# Patient Record
Sex: FEMALE | Race: WHITE
[De-identification: names, ages, dates, MRNs, and addresses within clinical notes are randomized per-mention and may not be internally consistent; named-entity substitution may affect disease eponyms.]

---

## 2017-04-30 ENCOUNTER — HOSPITAL ENCOUNTER (OUTPATIENT)
Dept: HOSPITAL 17 - NEDDLT | Age: 62
Setting detail: OBSERVATION
LOS: 2 days | Discharge: HOME | End: 2017-05-02
Attending: HOSPITALIST | Admitting: HOSPITALIST
Payer: OTHER GOVERNMENT

## 2017-04-30 VITALS
OXYGEN SATURATION: 96 % | TEMPERATURE: 97.9 F | SYSTOLIC BLOOD PRESSURE: 150 MMHG | RESPIRATION RATE: 20 BRPM | DIASTOLIC BLOOD PRESSURE: 76 MMHG | HEART RATE: 55 BPM

## 2017-04-30 VITALS — HEART RATE: 60 BPM

## 2017-04-30 VITALS
SYSTOLIC BLOOD PRESSURE: 115 MMHG | RESPIRATION RATE: 20 BRPM | TEMPERATURE: 97.1 F | DIASTOLIC BLOOD PRESSURE: 58 MMHG | OXYGEN SATURATION: 96 % | HEART RATE: 64 BPM

## 2017-04-30 VITALS — BODY MASS INDEX: 46.31 KG/M2 | HEIGHT: 60 IN | WEIGHT: 235.89 LBS

## 2017-04-30 VITALS
OXYGEN SATURATION: 96 % | TEMPERATURE: 97.6 F | HEART RATE: 56 BPM | DIASTOLIC BLOOD PRESSURE: 65 MMHG | RESPIRATION RATE: 18 BRPM | SYSTOLIC BLOOD PRESSURE: 146 MMHG

## 2017-04-30 VITALS
DIASTOLIC BLOOD PRESSURE: 54 MMHG | OXYGEN SATURATION: 99 % | HEART RATE: 55 BPM | TEMPERATURE: 97.8 F | SYSTOLIC BLOOD PRESSURE: 115 MMHG | RESPIRATION RATE: 18 BRPM

## 2017-04-30 VITALS — OXYGEN SATURATION: 93 %

## 2017-04-30 VITALS
OXYGEN SATURATION: 99 % | HEART RATE: 70 BPM | SYSTOLIC BLOOD PRESSURE: 125 MMHG | DIASTOLIC BLOOD PRESSURE: 84 MMHG | TEMPERATURE: 97.7 F | RESPIRATION RATE: 20 BRPM

## 2017-04-30 VITALS — HEART RATE: 68 BPM

## 2017-04-30 VITALS — OXYGEN SATURATION: 96 %

## 2017-04-30 DIAGNOSIS — I10: ICD-10-CM

## 2017-04-30 DIAGNOSIS — G89.29: ICD-10-CM

## 2017-04-30 DIAGNOSIS — K29.70: Primary | ICD-10-CM

## 2017-04-30 DIAGNOSIS — F17.210: ICD-10-CM

## 2017-04-30 DIAGNOSIS — E03.9: ICD-10-CM

## 2017-04-30 DIAGNOSIS — K20.9: ICD-10-CM

## 2017-04-30 DIAGNOSIS — Z90.49: ICD-10-CM

## 2017-04-30 DIAGNOSIS — M54.9: ICD-10-CM

## 2017-04-30 DIAGNOSIS — Z87.11: ICD-10-CM

## 2017-04-30 LAB
ALP SERPL-CCNC: 58 U/L (ref 45–117)
ALT SERPL-CCNC: 26 U/L (ref 10–53)
ANION GAP SERPL CALC-SCNC: 7 MEQ/L (ref 5–15)
AST SERPL-CCNC: 16 U/L (ref 15–37)
BASOPHILS # BLD AUTO: 0 TH/MM3 (ref 0–0.2)
BASOPHILS NFR BLD: 0.9 % (ref 0–2)
BILIRUB SERPL-MCNC: 0.3 MG/DL (ref 0.2–1)
BUN SERPL-MCNC: 12 MG/DL (ref 7–18)
CHLORIDE SERPL-SCNC: 109 MEQ/L (ref 98–107)
EOSINOPHIL # BLD: 0.3 TH/MM3 (ref 0–0.4)
EOSINOPHIL NFR BLD: 6.2 % (ref 0–4)
ERYTHROCYTE [DISTWIDTH] IN BLOOD BY AUTOMATED COUNT: 14.1 % (ref 11.6–17.2)
GFR SERPLBLD BASED ON 1.73 SQ M-ARVRAT: 70 ML/MIN (ref 89–?)
HCO3 BLD-SCNC: 24.5 MEQ/L (ref 21–32)
HCT VFR BLD CALC: 41.1 % (ref 35–46)
HDLC SERPL-MCNC: 39 MG/DL (ref 40–60)
HEMO FLAGS: (no result)
LDLC SERPL-MCNC: 99 MG/DL (ref 0–99)
LYMPHOCYTES # BLD AUTO: 2.2 TH/MM3 (ref 1–4.8)
LYMPHOCYTES NFR BLD AUTO: 48 % (ref 9–44)
MCH RBC QN AUTO: 29.1 PG (ref 27–34)
MCHC RBC AUTO-ENTMCNC: 32.5 % (ref 32–36)
MCV RBC AUTO: 89.6 FL (ref 80–100)
MONOCYTES NFR BLD: 8.7 % (ref 0–8)
NEUTROPHILS # BLD AUTO: 1.7 TH/MM3 (ref 1.8–7.7)
NEUTROPHILS NFR BLD AUTO: 36.2 % (ref 16–70)
PLATELET # BLD: 215 TH/MM3 (ref 150–450)
POTASSIUM SERPL-SCNC: 4.1 MEQ/L (ref 3.5–5.1)
RBC # BLD AUTO: 4.59 MIL/MM3 (ref 4–5.3)
SODIUM SERPL-SCNC: 140 MEQ/L (ref 136–145)
WBC # BLD AUTO: 4.6 TH/MM3 (ref 4–11)

## 2017-04-30 PROCEDURE — 86038 ANTINUCLEAR ANTIBODIES: CPT

## 2017-04-30 PROCEDURE — 96375 TX/PRO/DX INJ NEW DRUG ADDON: CPT

## 2017-04-30 PROCEDURE — G0378 HOSPITAL OBSERVATION PER HR: HCPCS

## 2017-04-30 PROCEDURE — 74177 CT ABD & PELVIS W/CONTRAST: CPT

## 2017-04-30 PROCEDURE — 82550 ASSAY OF CK (CPK): CPT

## 2017-04-30 PROCEDURE — 96374 THER/PROPH/DIAG INJ IV PUSH: CPT

## 2017-04-30 PROCEDURE — 85025 COMPLETE CBC W/AUTO DIFF WBC: CPT

## 2017-04-30 PROCEDURE — 85610 PROTHROMBIN TIME: CPT

## 2017-04-30 PROCEDURE — 43239 EGD BIOPSY SINGLE/MULTIPLE: CPT

## 2017-04-30 PROCEDURE — 80048 BASIC METABOLIC PNL TOTAL CA: CPT

## 2017-04-30 PROCEDURE — 83690 ASSAY OF LIPASE: CPT

## 2017-04-30 PROCEDURE — 99281 EMR DPT VST MAYX REQ PHY/QHP: CPT

## 2017-04-30 PROCEDURE — 88305 TISSUE EXAM BY PATHOLOGIST: CPT

## 2017-04-30 PROCEDURE — 87086 URINE CULTURE/COLONY COUNT: CPT

## 2017-04-30 PROCEDURE — 85652 RBC SED RATE AUTOMATED: CPT

## 2017-04-30 PROCEDURE — 99285 EMERGENCY DEPT VISIT HI MDM: CPT

## 2017-04-30 PROCEDURE — 80061 LIPID PANEL: CPT

## 2017-04-30 PROCEDURE — 80053 COMPREHEN METABOLIC PANEL: CPT

## 2017-04-30 PROCEDURE — 93005 ELECTROCARDIOGRAM TRACING: CPT

## 2017-04-30 PROCEDURE — 96361 HYDRATE IV INFUSION ADD-ON: CPT

## 2017-04-30 PROCEDURE — 00740: CPT

## 2017-04-30 PROCEDURE — 84484 ASSAY OF TROPONIN QUANT: CPT

## 2017-04-30 PROCEDURE — 85730 THROMBOPLASTIN TIME PARTIAL: CPT

## 2017-04-30 PROCEDURE — 71010: CPT

## 2017-04-30 PROCEDURE — 81001 URINALYSIS AUTO W/SCOPE: CPT

## 2017-04-30 RX ADMIN — Medication SCH ML: at 20:17

## 2017-04-30 RX ADMIN — ONDANSETRON PRN MG: 2 INJECTION, SOLUTION INTRAMUSCULAR; INTRAVENOUS at 06:18

## 2017-04-30 RX ADMIN — DICYCLOMINE HYDROCHLORIDE SCH MG: 20 TABLET ORAL at 12:40

## 2017-04-30 RX ADMIN — OXYCODONE HYDROCHLORIDE AND ACETAMINOPHEN PRN TAB: 10; 325 TABLET ORAL at 12:41

## 2017-04-30 RX ADMIN — Medication SCH ML: at 08:46

## 2017-04-30 RX ADMIN — NEBIVOLOL HYDROCHLORIDE SCH MG: 10 TABLET ORAL at 10:01

## 2017-04-30 RX ADMIN — DICYCLOMINE HYDROCHLORIDE SCH MG: 20 TABLET ORAL at 08:45

## 2017-04-30 RX ADMIN — ONDANSETRON PRN MG: 2 INJECTION, SOLUTION INTRAMUSCULAR; INTRAVENOUS at 12:42

## 2017-04-30 RX ADMIN — HYDROMORPHONE HYDROCHLORIDE PRN MG: 1 INJECTION, SOLUTION INTRAMUSCULAR; INTRAVENOUS; SUBCUTANEOUS at 20:17

## 2017-04-30 RX ADMIN — ONDANSETRON PRN MG: 2 INJECTION, SOLUTION INTRAMUSCULAR; INTRAVENOUS at 20:23

## 2017-04-30 RX ADMIN — Medication PRN ML: at 20:24

## 2017-04-30 RX ADMIN — HYDROMORPHONE HYDROCHLORIDE PRN MG: 1 INJECTION, SOLUTION INTRAMUSCULAR; INTRAVENOUS; SUBCUTANEOUS at 15:48

## 2017-04-30 RX ADMIN — OXYCODONE HYDROCHLORIDE AND ACETAMINOPHEN PRN TAB: 10; 325 TABLET ORAL at 21:41

## 2017-04-30 RX ADMIN — HYDROMORPHONE HYDROCHLORIDE PRN MG: 1 INJECTION, SOLUTION INTRAMUSCULAR; INTRAVENOUS; SUBCUTANEOUS at 11:31

## 2017-04-30 RX ADMIN — DICYCLOMINE HYDROCHLORIDE SCH MG: 20 TABLET ORAL at 17:16

## 2017-04-30 RX ADMIN — PANTOPRAZOLE SCH MG: 40 TABLET, DELAYED RELEASE ORAL at 08:45

## 2017-04-30 RX ADMIN — OXYCODONE HYDROCHLORIDE AND ACETAMINOPHEN PRN TAB: 10; 325 TABLET ORAL at 17:17

## 2017-04-30 RX ADMIN — ASPIRIN SCH MG: 325 TABLET ORAL at 08:45

## 2017-04-30 NOTE — HHI.HP
__________________________________________________





[facility]


Service


[facility]


Primary Care Physician


Non-Staff


Admission Diagnosis





Diagnoses:  


Chief Complaint:  


Abdominal pain and nausea


Travel History


International Travel<30 Days:  No


Contact w/Intl Traveler <30 Da:  No


History of Present Illness


61 years old female presented to the ED with the complaint of severe abdominal 

pain mostly on the right side reach 10 out of 10.  Positive nausea no vomiting, 

no diarrhea or constipation.  Patient reported spider bite on the left side of 

her abdomen which a week ago which was sore and swelling in her legs and feet 

and in her hands.


No diarrhea or constipation dysuria urgency frequency orthopnea or PND





Review of Systems


All other systems reviewed and was positive only for what is mentioned in 

history of present illness otherwise negative





Past Family Social History


Past Medical History








Anxiety.


Hypertension.


Chronic back pain for three chronic discs.


 Hypothyroidism.


Past Surgical History


  x3 with  in  requiring bowel resection for ischemic


   bowel according to the patient, with perforation.


 Cholecystectomy many years ago.


 Hysterectomy.


 Appendectomy.


Allergies:  


Coded Allergies:  


     Fentanyl (Verified  Allergy, Severe, Hives, 17)


     Metoprolol (Verified  Allergy, Severe, HIVES, 17)


     Morphine (Verified  Allergy, Severe, Hives, 17)


 TOPICAL MORPHINE CAUSES ITCHING


     Norvasc (Verified  Allergy, Severe, Tachycardia, 17)


     Toradol (Verified  Allergy, Severe, Hives, 17)


     Ultram (Verified  Allergy, Severe, Hives, 17)


Uncoded Allergies:  


     STEROIDS (Allergy, Severe, 17)


Family History


Denied significantH/O disease runs in the family


Social History


Smokes half pack a day no alcohol or illicit drug abuse





Physical Exam


Vital Signs





 Vital Signs








  Date Time  Temp Pulse Resp B/P Pulse Ox O2 Delivery O2 Flow Rate FiO2


 


17 10:12     93   21


 


17 07:44 97.1 64 20 115/58 96   


 


17 05:30 97.7 70 20 125/84 99   








Physical Exam


GENERAL: This is a well-nourished, well-developed patient, in no apparent 

distress.


SKIN: No rashes, warm and dry 


HEAD: Atraumatic. Normocephalic. 


EYES: Pupils equal round and reactive. Extraocular motions intact. No scleral 

icterus. 


ENT: Nose without bleeding, or drainage, Airway patent.


NECK: Trachea midline.  Supple


CARDIOVASCULAR: Regular rate and rhythm without murmurs, gallops, or rubs. 


RESPIRATORY: Fair air entry bilaterally. No wheezes, rales, or rhonchi.  


GASTROINTESTINAL: Abdomen soft, significant tenderness to light palpation, 

nondistended.  Positive bowel sounds, no guarding, 4-5 cm discoloration macula 

on the left lower quadrant(patient reported spider bite)


MUSCULOSKELETAL: Extremities without clubbing, cyanosis, or edema.  Pedal 

pulses appreciated


NEUROLOGICAL: Awake and alert.  Moves all extremity.  Normal speech.no focal 

neurological deficit





Assessment and Plan


Assessment and Plan


61 years old female came with





Severe right abdominal pain with nausea rule out PUD versus gastritis versus 

shingles


History of multiple abdominal surgeries in the past rule out adhesions


History of stomach ulcer


Hypothyroidism


DVT prophylaxis





Plan:


Patient admitted for observation


Surgery consulted discussed with Dr. Cisneros who recommended GI consultation for 

EGD to rule out stomach ulcer 


Continue pain management


Iv fluid


Clear liquid diet


Cardiac enzyme negative ordered to rule out underlying ischemic source


Started on morphine will place on Percocet and Dilaudid as needed


Protonix


SCD for DVT prophylaxis, will hold off on chemical DVT prophylaxis until ruling 

out GI ulcer


Discussed Condition With


Patient and Dr. Cisneros surgical attending








Nicanor Rowe MD 2017 10:56

## 2017-04-30 NOTE — PD.CONS
HPI


History of Present Illness


This is a 61 year old female who presented to [place] ER with severe RUQ 

abdominal pain, 10/10. She reports that over the past year she has had mild 

diffuse abdominal tenderness, however over the past 1-2 weeks the intensity of 

the pain has increased and localizes at RUQ. Reports that she had EGD in  

or  at [facility], but she does not know the results. Reports last colonoscopy 

about 3 years ago, with 9 polyps. She has not had FU exam. She does take 

Protonix as needed at home for heartburn. She denies and N/V. No change in 

bowel habits. No blood in stool.  (Evie Tam)





PFSH


Past Medical History


-Anxiety.


-Hypertension.


-Chronic back pain 


-Hypothyroidism.


Past Surgical History


- x3 with  in  requiring bowel resection for ischemic 

bowel according to the patient, with perforation.


-Cholecystectomy many years ago.


-Hysterectomy.


-Appendectomy.


  (Evie Tam)


Coded Allergies:  


     Fentanyl (Verified  Allergy, Severe, Hives, 17)


     Metoprolol (Verified  Allergy, Severe, HIVES, 17)


     Morphine (Verified  Allergy, Severe, Hives, 17)


 TOPICAL MORPHINE CAUSES ITCHING


     Norvasc (Verified  Allergy, Severe, Tachycardia, 17)


     Toradol (Verified  Allergy, Severe, Hives, 17)


     Ultram (Verified  Allergy, Severe, Hives, 17)


Uncoded Allergies:  


     STEROIDS (Allergy, Severe, 17)


Medications





Current Medications








 Medications


  (Trade)  Dose


 Ordered  Sig/Kareem


 Route


 PRN Reason  Start Time


 Stop Time Status Last Admin


Dose Admin


 


 Sodium Chloride


  (NS Flush)  2 ml  UNSCH  PRN


 IV FLUSH


 FLUSH AFTER USING IV ACCESS  17 05:15


     


 


 


 Sodium Chloride


  (NS Flush)  2 ml  BID


 IV FLUSH


   17 09:00


    17 08:46


 


 


 Acetaminophen


  (Tylenol)  650 mg  Q4H  PRN


 PO


 TEMP > 100.4  17 05:15


    17 06:18


 


 


 Ondansetron HCl


  (Zofran Inj)  4 mg  Q6H  PRN


 IVP


 NAUSEA OR VOMITING  17 05:15


    17 12:42


 


 


 Aspirin


  (Aspirin)  325 mg  DAILY


 PO


   17 09:00


    17 08:45


 


 


 Dicyclomine HCl


  (Bentyl)  20 mg  TID


 PO


   17 09:00


    17 12:40


 


 


 Nebivolol


  (Bystolic)  10 mg  DAILY


 PO


   17 09:00


    17 10:01


 


 


 Pantoprazole


 Sodium


  (Protonix)  40 mg  DAILY


 PO


   17 09:00


    17 08:45


 


 


 Hydromorphone HCl


  (Dilaudid Pf Inj)  0.5 mg  Q4H  PRN


 IV PUSH


 breakthrough pain  17 11:15


    17 15:48


 


 


 Oxycodone/


 Acetaminophen


  (Percocet  5-325


 Mg)  1 tab  Q4H  PRN


 PO


 pain 1-5  17 11:15


     


 


 


 Oxycodone/


 Acetaminophen


  (Percocet 


 Mg)  1 tab  Q4H  PRN


 PO


 pain 6-10  17 11:15


    17 12:41


 








Family History


Denies any medical issues in family.


Social History


ETOH: Denies


Tobacco: 1/2 PPD


Illicit Drugs: Denies  (Evie Tam)





Review of Systems


Constitutional:  DENIES: Diaphoretic episodes, Fatigue, Fever, Weight gain, 

Weight loss, Chills, Dizziness, Change in appetite, Night Sweats


Endocrine:  DENIES: Polydipsia, Polyuria


Eyes:  DENIES: Blurred vision, Photosensitivity, Double Vision


Ears, nose, mouth, throat:  DENIES: Hearing loss, Vertigo, Oral lesions, Throat 

pain, Hoarseness


Respiratory:  DENIES: Cough, Wheezing, Hemoptysis, Sputum production, Shortness 

of breath


Cardiovascular:  DENIES: Chest pain, Palpitations, Syncope, Lower Extremity 

Edema, Orthopnea, Claudication


Gastrointestinal:  COMPLAINS OF: Abdominal pain,  DENIES: Black stools, Bloody 

stools, Constipation, Diarrhea, Nausea, Vomiting, Difficulty Swallowing, 

Anorexia, Odynophagia, Swelling of Abdomen, Heartburn, Hematemesis


Genitourinary:  DENIES: Urinary frequency, Urinary incontinence, Urgency, 

Hematuria, Dysuria, Nocturia


Musculoskeletal:  DENIES: Joint pain, Muscle aches, Stiffness, Joint Swelling, 

Back pain, Neck pain


Integumentary:  DENIES: Abnormal pigmentation, Nail changes, Pruritus, Rash, 

Jaundice


Hematologic/lymphatic:  DENIES: Bruising, Lymphadenopathy


Immunologic/allergic:  DENIES: Eczema, Urticaria


Neurologic:  DENIES: Abnormal gait, Headache, Localized weakness, Paresthesias


Psychiatric:  DENIES: Anxiety, Confusion, Mood changes, Depression, Agitation, 

Suicidal Ideation (Evie Tam)





GI Exam


Vitals I&O





 Vital Signs








  Date Time  Temp Pulse Resp B/P Pulse Ox O2 Delivery O2 Flow Rate FiO2


 


17 12:18 97.8 55 18 115/54 99   


 


17 12:10  68      


 


17 10:12     93   21


 


17 07:44 97.1 64 20 115/58 96   


 


17 05:30 97.7 70 20 125/84 99   








Laboratory











Test 17





 11:55


 


White Blood Count 4.6 TH/MM3


 


Red Blood Count 4.59 MIL/MM3


 


Hemoglobin 13.3 GM/DL


 


Hematocrit 41.1 %


 


Mean Corpuscular Volume 89.6 FL


 


Mean Corpuscular Hemoglobin 29.1 PG


 


Mean Corpuscular Hemoglobin 32.5 %





Concent 


 


Red Cell Distribution Width 14.1 %


 


Platelet Count 215 TH/MM3


 


Mean Platelet Volume 9.4 FL


 


Neutrophils (%) (Auto) 36.2 %


 


Lymphocytes (%) (Auto) 48.0 %


 


Monocytes (%) (Auto) 8.7 %


 


Eosinophils (%) (Auto) 6.2 %


 


Basophils (%) (Auto) 0.9 %


 


Neutrophils # (Auto) 1.7 TH/MM3


 


Lymphocytes # (Auto) 2.2 TH/MM3


 


Monocytes # (Auto) 0.4 TH/MM3


 


Eosinophils # (Auto) 0.3 TH/MM3


 


Basophils # (Auto) 0.0 TH/MM3


 


CBC Comment DIFF FINAL 


 


Differential Comment  


 


Sodium Level 140 MEQ/L


 


Potassium Level 4.1 MEQ/L


 


Chloride Level 109 MEQ/L


 


Carbon Dioxide Level 24.5 MEQ/L


 


Anion Gap 7 MEQ/L


 


Blood Urea Nitrogen 12 MG/DL


 


Creatinine 0.83 MG/DL


 


Estimat Glomerular Filtration 70 ML/MIN





Rate 


 


Random Glucose 85 MG/DL


 


Calcium Level 8.4 MG/DL


 


Total Bilirubin 0.3 MG/DL


 


Aspartate Amino Transf 16 U/L





(AST/SGOT) 


 


Alanine Aminotransferase 26 U/L





(ALT/SGPT) 


 


Alkaline Phosphatase 58 U/L


 


Troponin I LESS THAN 0.02





 NG/ML


 


Total Protein 6.7 GM/DL


 


Albumin 3.1 GM/DL


 


Triglycerides Level 140 MG/DL


 


Cholesterol Level 166 MG/DL


 


LDL Cholesterol 99 MG/DL


 


HDL Cholesterol 39.0 MG/DL


 


Cholesterol/HDL Ratio 4.25 RATIO








Physical Examination


HEENT: Pupils round and reactive to light; normocephalic; atraumatic; no 

jaundice.  Throat is clear.


NECK: Neck is supple, no JVD, no lymphadenopathy.


CHEST:  Chest is clear to auscultation and percussion.


CARDIAC:  Regular rate and rhythm with no murmur gallop or rubs.


ABDOMEN:  Soft, nondistended, RUQ tenderness; no hepatosplenomegaly; bowel 

sounds are present in all four quadrants.


EXTREMITIES: No clubbing, cyanosis, or edema.


SKIN:  Normal; no rash; no jaundice.


CNS:  No focal deficits; alert and oriented times three. (Evie Tam)





Assessment and Plan


Plan


ASSESSMENT:


-RUQ pain, Gastric ulcer? CT was performed yesterday, per primary note, which 

showed a few colonic diverticuli and stool, otherwise normal. WBC normal. Liver 

enzymes normal.


 Last EGD in  or  and patient unsure what the results were. Does use 

NSAIDs at home and takes Percocet. 





PLAN:


-Plan for EGD on Tuesday


-Obtain consents


-Bowel prep Monday 


-NPO after MN on Monday night


-PPI


-Further recommendations to follow based on results of above.





Patient seen and examined by Dr. Ellsworth and myself and this note is written 

on his behalf.  (Evie Tam)


Physician Comments


Patient seen and examined


Agree with above


Continue with current supportive care


Monitor labs


Plan for an EGD tomorrow to rule out peptic ulcer disease


Patient's pain may be musculoskeletal and abdominal wall and may relate to her 

chronic back pain


Further recommendations shall depend on the hospital course and EGD findings (

Garret Ellsworth MD)








Evie Tam 2017 16:37


Garret Ellsworth MD 2017 23:29

## 2017-04-30 NOTE — MB
cc:

FLAKITA LENNON M.D.

****

 

 

DATE OF CONSULTATION:  2017

 

REASON FOR CONSULTATION:

Severe right-sided abdominal pain.

 

HISTORY OF PRESENT ILLNESS

The patient is a 61-year-old female with a 1-year history of abdominal pain and

multiple other medical problems who presented to the [facility] emergency room

yesterday due to severe right upper quadrant pain becoming a 10/10 with mild

shortness of breath.  The patient reports a one year history of pain over the

entire abdomen but this is much more severe and acutely increased.  The patient

reports that she had a spider bite one week ago and developed increasing pain,

swelling in her legs and feet, and in her hands with increasing soreness. She

denies any problems with constipation, change in bowel habits, nausea or

vomiting.

 

She has an extensive past medical history including:

 

PAST SURGICAL HISTORY:

1.  x3 with  in  requiring bowel resection for ischemic

   bowel according to the patient, with perforation.

2. Cholecystectomy many years ago.

3. Hysterectomy.

4. Appendectomy.

 

SOCIAL HISTORY:

She does not drink.  She smokes 1/2 pack per day, does not use any other

substances.

 

PAST MEDICAL HISTORY:

Quite extensive:

1. Anxiety.

2. Hypertension.

3. Chronic back pain for three chronic discs.

4. Hypothyroidism.

5. Previous history of stomach ulcer.

 

The patient also reports that she last had a colonoscopy approximately three

years ago and had about 9 polyps.  She does not recall when the

gastroenterologist recommended that she undergo repeat colonoscopy.

 

ALLERGIES:

The patient has multiple severe significant allergies including:

FENTANYL WHICH CAUSES HIVES

METOPROLOL CAUSES HIVES

MORPHINE CAUSES HIVES, INCLUDING TOPICAL MORPHINE CAUSING ITCHING.

NORVASC CAUSES TACHYCARDIA.

TORADOL CAUSES HIVES.

ULTRAM CAUSES HIVES.

STEROIDS - CAUSES SOME PROBLEMS WITH HIVES AND ITCHING.

 

CURRENT MEDICATIONS:

On admission.

1. Percocet 10, one p.o. q6 hours p.r.n.

2. Dicyclomine 20 milligrams p.o. t.i.d.

3. Protonix 40 milligrams q day.

4. Clonazepam 1 milligram p.o. t.i.d.

5. Bystolic 10 milligrams p.o. q day.

 

REVIEW OF SYSTEMS:

All negative except as indicated above.

 

PHYSICAL EXAMINATION:

Reveals an obese  female who is uncomfortable but not in acute

distress.

VITAL SIGNS: /58, pulse 64, respiratory rate 20, temperature 97.1, 96%

saturation on room air. Sclerae anicteric.  Chest:  Clear to auscultation.

Cardiac exam: Reveals regular rate and rhythm.

Abdomen: Soft with multiple laparoscopic scars.  There is a Pfannenstiel's

incision inferiorly.  There are no hernias noted that can be palpated.  The

patient has a fairly significant pain on the right side of the abdomen with the

worst pain with guarding and rebound in the right upper quadrant.  There is a

small area of ecchymosis in the left lower quadrant where the patient had her

previous spider bite; this is very tender to palpation but does not have any

necrotic areas.  Pulses are present.

Extremities: The patient moves all four extremities without difficulty.  Pulses

are present.  The patient does have a small amount of puffiness in the fingers

and in the feet.  This is nontender to palpation.

 

LABORATORY VALUES:

From yesterday at the [facility] emergency room demonstrates WBCs of 5.6,

platelets are 318,000.

 

Chemistries are essentially within normal limits except for chloride high at

110 and GFR low at 64.

 

Coags demonstrate INR of 1.1.

 

The patient has a urine culture that is pending.  Her urinalysis demonstrated 0

to 3 WBCs, rare WBC clumps.  Urine leukocyte esterase was negative.

 

IMAGING STUDIES

Performed yesterday demonstrates few colonic diverticuli and stool but

otherwise unremarkable CT scan.  Specifically there is no areas of inflammation

or ductal dilatation.

 

Chest x-ray performed on the same day was unremarkable with no pleural

effusions and no acute cardiopulmonary disease.

 

ASSESSMENT

Right sided abdominal pain primarily in the right upper quadrant; she does

report using some nonsteroidal anti-inflammatories together with the Percocet.

It is possible she has an ulcer that is causing issues.

 

Autoimmune phenomenon of some type since the arthropod bite is possible though

not likely.  I have taken the liberty of ordering a sed rate and an antinuclear

antibody and have asked the GI service to see the patient for consideration of

upper endoscopy.

 

The patient does not appear to require any surgical intervention at this time

but we will follow along until she has further workup.

 

 

                              _________________________________

                              MD DARIUSZ Marion/SOL

D:  2017/11:17 AM

T:  2017/11:27 AM

Visit #:  [id number]

Job #:  [id number]

## 2017-04-30 NOTE — EKG
Date Performed: 04/30/2017       Time Performed: 10:59:42

 

PTAGE:      61 years

 

EKG:      Sinus rhythm 

 

 WITH SINUS ARRHYTHMIA NORMAL ECG Compared to prior tracing no significant change

 

DOCTOR:   Patricia Clinton  Interpretating Date/Time  04/30/2017 18:31:50

## 2017-05-01 VITALS
HEART RATE: 65 BPM | SYSTOLIC BLOOD PRESSURE: 130 MMHG | DIASTOLIC BLOOD PRESSURE: 57 MMHG | OXYGEN SATURATION: 100 % | RESPIRATION RATE: 20 BRPM | TEMPERATURE: 97.7 F

## 2017-05-01 VITALS
DIASTOLIC BLOOD PRESSURE: 69 MMHG | SYSTOLIC BLOOD PRESSURE: 147 MMHG | HEART RATE: 57 BPM | OXYGEN SATURATION: 98 % | RESPIRATION RATE: 18 BRPM | TEMPERATURE: 98 F

## 2017-05-01 VITALS
OXYGEN SATURATION: 97 % | RESPIRATION RATE: 18 BRPM | TEMPERATURE: 98.1 F | HEART RATE: 75 BPM | SYSTOLIC BLOOD PRESSURE: 124 MMHG | DIASTOLIC BLOOD PRESSURE: 58 MMHG

## 2017-05-01 VITALS
HEART RATE: 62 BPM | DIASTOLIC BLOOD PRESSURE: 84 MMHG | OXYGEN SATURATION: 100 % | RESPIRATION RATE: 20 BRPM | TEMPERATURE: 98.7 F | SYSTOLIC BLOOD PRESSURE: 123 MMHG

## 2017-05-01 VITALS
HEART RATE: 62 BPM | RESPIRATION RATE: 20 BRPM | TEMPERATURE: 98.3 F | DIASTOLIC BLOOD PRESSURE: 54 MMHG | SYSTOLIC BLOOD PRESSURE: 123 MMHG | OXYGEN SATURATION: 100 %

## 2017-05-01 VITALS
DIASTOLIC BLOOD PRESSURE: 65 MMHG | OXYGEN SATURATION: 99 % | TEMPERATURE: 97 F | HEART RATE: 66 BPM | SYSTOLIC BLOOD PRESSURE: 151 MMHG | RESPIRATION RATE: 20 BRPM

## 2017-05-01 VITALS
RESPIRATION RATE: 18 BRPM | HEART RATE: 63 BPM | DIASTOLIC BLOOD PRESSURE: 56 MMHG | SYSTOLIC BLOOD PRESSURE: 109 MMHG | OXYGEN SATURATION: 100 % | TEMPERATURE: 98.5 F

## 2017-05-01 VITALS — OXYGEN SATURATION: 99 %

## 2017-05-01 LAB
ANION GAP SERPL CALC-SCNC: 6 MEQ/L (ref 5–15)
BASOPHILS # BLD AUTO: 0.1 TH/MM3 (ref 0–0.2)
BASOPHILS NFR BLD: 1.1 % (ref 0–2)
BUN SERPL-MCNC: 16 MG/DL (ref 7–18)
CHLORIDE SERPL-SCNC: 106 MEQ/L (ref 98–107)
EOSINOPHIL # BLD: 0.2 TH/MM3 (ref 0–0.4)
EOSINOPHIL NFR BLD: 5 % (ref 0–4)
ERYTHROCYTE [DISTWIDTH] IN BLOOD BY AUTOMATED COUNT: 13.5 % (ref 11.6–17.2)
ERYTHROCYTE [SEDIMENTATION RATE] IN BLOOD BY WESTERGREN METHOD: 14 MM/HR (ref 0–30)
GFR SERPLBLD BASED ON 1.73 SQ M-ARVRAT: 56 ML/MIN (ref 89–?)
HCO3 BLD-SCNC: 27.6 MEQ/L (ref 21–32)
HCT VFR BLD CALC: 40.6 % (ref 35–46)
HEMO FLAGS: (no result)
LYMPHOCYTES # BLD AUTO: 1.7 TH/MM3 (ref 1–4.8)
LYMPHOCYTES NFR BLD AUTO: 37.5 % (ref 9–44)
MCH RBC QN AUTO: 29.5 PG (ref 27–34)
MCHC RBC AUTO-ENTMCNC: 32.9 % (ref 32–36)
MCV RBC AUTO: 89.7 FL (ref 80–100)
MONOCYTES NFR BLD: 8.3 % (ref 0–8)
NEUTROPHILS # BLD AUTO: 2.2 TH/MM3 (ref 1.8–7.7)
NEUTROPHILS NFR BLD AUTO: 48.1 % (ref 16–70)
PLATELET # BLD: 200 TH/MM3 (ref 150–450)
POTASSIUM SERPL-SCNC: 4.1 MEQ/L (ref 3.5–5.1)
RBC # BLD AUTO: 4.52 MIL/MM3 (ref 4–5.3)
SODIUM SERPL-SCNC: 140 MEQ/L (ref 136–145)
WBC # BLD AUTO: 4.6 TH/MM3 (ref 4–11)

## 2017-05-01 RX ADMIN — HYDROMORPHONE HYDROCHLORIDE PRN MG: 1 INJECTION, SOLUTION INTRAMUSCULAR; INTRAVENOUS; SUBCUTANEOUS at 04:40

## 2017-05-01 RX ADMIN — DICYCLOMINE HYDROCHLORIDE SCH MG: 20 TABLET ORAL at 08:41

## 2017-05-01 RX ADMIN — Medication PRN ML: at 04:42

## 2017-05-01 RX ADMIN — OXYCODONE HYDROCHLORIDE AND ACETAMINOPHEN PRN TAB: 10; 325 TABLET ORAL at 02:55

## 2017-05-01 RX ADMIN — DICYCLOMINE HYDROCHLORIDE SCH MG: 20 TABLET ORAL at 16:43

## 2017-05-01 RX ADMIN — ASPIRIN SCH MG: 325 TABLET ORAL at 08:41

## 2017-05-01 RX ADMIN — Medication SCH ML: at 08:41

## 2017-05-01 RX ADMIN — DICYCLOMINE HYDROCHLORIDE SCH MG: 20 TABLET ORAL at 14:37

## 2017-05-01 RX ADMIN — OXYCODONE HYDROCHLORIDE AND ACETAMINOPHEN PRN TAB: 10; 325 TABLET ORAL at 14:46

## 2017-05-01 RX ADMIN — NEBIVOLOL HYDROCHLORIDE SCH MG: 10 TABLET ORAL at 08:39

## 2017-05-01 RX ADMIN — Medication PRN ML: at 00:53

## 2017-05-01 RX ADMIN — Medication SCH ML: at 20:54

## 2017-05-01 RX ADMIN — ONDANSETRON PRN MG: 2 INJECTION, SOLUTION INTRAMUSCULAR; INTRAVENOUS at 04:42

## 2017-05-01 RX ADMIN — ONDANSETRON PRN MG: 2 INJECTION, SOLUTION INTRAMUSCULAR; INTRAVENOUS at 20:49

## 2017-05-01 RX ADMIN — PANTOPRAZOLE SCH MG: 40 TABLET, DELAYED RELEASE ORAL at 08:41

## 2017-05-01 RX ADMIN — HYDROMORPHONE HYDROCHLORIDE PRN MG: 1 INJECTION, SOLUTION INTRAMUSCULAR; INTRAVENOUS; SUBCUTANEOUS at 16:36

## 2017-05-01 RX ADMIN — OXYCODONE HYDROCHLORIDE AND ACETAMINOPHEN PRN TAB: 10; 325 TABLET ORAL at 21:58

## 2017-05-01 RX ADMIN — SUCRALFATE SCH GM: 1 SUSPENSION ORAL at 14:36

## 2017-05-01 RX ADMIN — HYDROMORPHONE HYDROCHLORIDE PRN MG: 1 INJECTION, SOLUTION INTRAMUSCULAR; INTRAVENOUS; SUBCUTANEOUS at 20:50

## 2017-05-01 RX ADMIN — HYDROMORPHONE HYDROCHLORIDE PRN MG: 1 INJECTION, SOLUTION INTRAMUSCULAR; INTRAVENOUS; SUBCUTANEOUS at 00:53

## 2017-05-01 RX ADMIN — SUCRALFATE SCH GM: 1 SUSPENSION ORAL at 20:53

## 2017-05-01 RX ADMIN — HYDROMORPHONE HYDROCHLORIDE PRN MG: 1 INJECTION, SOLUTION INTRAMUSCULAR; INTRAVENOUS; SUBCUTANEOUS at 08:38

## 2017-05-01 NOTE — HHI.PR
Subjective


Subjective Notes


Resting in bed


Requests that I do not touch her abdomen


Says the IV pain meds only work for a short period of time





Objective


Vitals/I&O





Vital Signs








  Date Time  Temp Pulse Resp B/P Pulse Ox O2 Delivery O2 Flow Rate FiO2


 


5/1/17 13:39 97.7 65 20 130/57 100   


 


4/30/17 18:52        21








Cardiovascular:  Regular


Lungs:  Clear


Abdomen:  Other (soft non tender; no reboud tenderness; RIGHT sided abdominal 

pain )


Extremities:  No edema





A/P


Assessment and Plan


61 year old female severe RIGHT sided abdominal pain 





-s/p EGD today--- gastritis--- started on Carafate 


-Heart Healthy diet 


-Pain control


-GI following


-No acute surgical interventions at this time


===========================


Attending Note - Dr. Cisneros





Pain much better now; explained use of carafate for symptoms


As above


Will sign off; reconsult if needed.








Catarina Cook May 1, 2017 15:53


Ramu Cisneros MD May 1, 2017 18:45

## 2017-05-01 NOTE — HHI.PR
Subjective


Remarks


Patient status post EGD today, report pending


She still feeling pain in her abdomen around 8 out of 10 even after 1 mg of 

Dilaudid


Afebrile, surgery and GI following





Objective


Vitals





 Vital Signs








  Date Time  Temp Pulse Resp B/P Pulse Ox O2 Delivery O2 Flow Rate FiO2


 


5/1/17 13:39 97.7 65 20 130/57 100   


 


5/1/17 12:16  62 16 148/68 98   


 


5/1/17 12:06  67 18 150/68 99   


 


5/1/17 11:56 97.6 68 18 130/60 98   


 


5/1/17 09:32 98.7 62 20 123/84 100   


 


5/1/17 08:28 98.3 62 20 123/54 100   


 


5/1/17 04:00 98.1 75 18 124/58 97   


 


5/1/17 00:00 98.5 63 18 109/56 100   


 


4/30/17 21:00  60      


 


4/30/17 20:00 97.6 56 18 146/65 96   


 


4/30/17 18:52     96   21


 


4/30/17 16:31 97.9 55 20 150/76 96   








 I/O








 4/30/17 4/30/17 4/30/17 5/1/17 5/1/17 5/1/17





 07:00 15:00 23:00 07:00 15:00 23:00


 


Intake Total   520 ml 480 ml 300 ml 


 


Balance   520 ml 480 ml 300 ml 


 


      


 


Intake Oral   520 ml 480 ml  


 


Other     300 ml 


 


# Voids   1 1  








Result Diagram:  


4/30/17 1155                                                                   

             4/30/17 1155





Objective Remarks


GENERAL: This is a well-nourished, well-developed patient, in no apparent 

distress.


SKIN: No rashes, warm and dry 


HEAD: Atraumatic. Normocephalic. 


EYES: Pupils equal round and reactive. Extraocular motions intact. No scleral 

icterus. 


ENT: Nose without bleeding, or drainage, Airway patent.


NECK: Trachea midline.  Supple


CARDIOVASCULAR: Regular rate and rhythm without murmurs, gallops, or rubs. 


RESPIRATORY: Fair air entry bilaterally. No wheezes, rales, or rhonchi.  


GASTROINTESTINAL: Abdomen soft, significant tenderness to light palpation, 

nondistended.  Positive bowel sounds, no guarding, 4-5 cm discoloration macula 

on the left lower quadrant(patient reported spider bite)


MUSCULOSKELETAL: Extremities without clubbing, cyanosis, or edema.  Pedal 

pulses appreciated


NEUROLOGICAL: Awake and alert.  Moves all extremity.  Normal speech.no focal 

neurological deficit





A/P


Assessment and Plan





61 years old female came with





Severe right abdominal pain with nausea rule out PUD versus gastritis versus 

shingles


History of multiple abdominal surgeries in the past rule out adhesions


History of stomach ulcer


Hypothyroidism


DVT prophylaxis





Plan:





Surgery consulted discussed with Dr. Cisneros who recommended GI consultation for 

EGD to rule out stomach ulcer 


Status post EGD 5/1, report pending


Continue pain management


Iv fluid


Diet per GI


Cardiac enzyme negative ordered to rule out underlying ischemic source


Started on morphine will place on Percocet and Dilaudid as needed


Protonix


SCD for DVT prophylaxis, will hold off on chemical DVT prophylaxis until ruling 

out GI ulcer








Nicanor Rowe MD May 1, 2017 15:30

## 2017-05-01 NOTE — GIPROC
[facility]

[address] [facility]. [facility], [address]

 

 

EGD PROCEDURE REPORT     EXAM DATE: 05/01/2017

 

PATIENT NAME:      Millicent Alegre           MR #:      [id number]

YOB: 1955      VISIT #:     [id number]

ATTENDING:     Bree Ulloa MD     ORDER #:     [id number]

ASSISTANT:      Aleja Cunningham and Sadiq Thomason     STATUS:     inpatient

 

 

INDICATIONS:  The patient is a 61 yr old female here for an EGD due to

epigastric abdominal pain

PROCEDURE PERFORMED:     EGD w/ biopsy

MEDICATIONS:     None and Per Anesthesia.

TOPICAL ANESTHETIC:

 

CONSENT: The patient understands the risks and benefits of the procedure and

understands that these risks include, but are not limited to: sedation,

allergic reaction, infection, perforation and/or bleeding. Alternative means of

evaluation and treatment include, among others: physical exam, x-rays, and/or

surgical intervention. The patient elects to proceed with this endoscopic

procedure.

 



medical equipment was checked for proper function. Hand hygiene and appropriate

measures for infection prevention was taken. After the risks, benefits and

alternatives of the procedure were thoroughly explained, Informed consent was

verified, confirmed and timeout was successfully executed by the treatment

team. The patient was anesthetized with topical anesthesia and the [employer]

[id number] endoscope was introduced through the mouth and advanced to the second

portion of the duodenum.  Retroflexed views revealed no abnormalities  The

gastroscope was then slowly withdrawn and removed.

 

ESOPHAGUS: There was LA Class A esophagitis noted.  A biopsy was performed using

cold forceps.  Sample sent for histology.

 

STOMACH: There was erythematous severe and erosive gastritis in the gastric

antrum.  A biopsy was performed using cold forceps.  Sample sent for histology.

 

 

DUODENUM: The duodenal mucosa appeared normal in the 2nd part of the duodenum.

 

 

 

 

ADVERSE EVENTS:     There were no complications.

IMPRESSIONS:     1.  There was LA Class A esophagitis noted; biopsy was

performed

2.  There was erythematous gastritis in the gastric antrum; biopsy was performed

 

3.  Normal duodenal mucosa in the 2nd part of the duodenum

4.  Retroflexed views revealed no abnormalities

 

RECOMMENDATIONS:     1.  Await biopsy results.  Biopsy results will not be ready

for 7-10 days.  If you don't hear from us in two weeks, call our office for

biopsy results.

2.  Anti-reflux regimen

3.  Continue PPI

4.  Avoid NSAIDS

PATIENT CONDITION:     stable

DISPOSITION:     Inpatient

REPEAT EXAM:     Return 1 year EGD pending biopsy results

 

 

___________________________________

Bree Ulloa MD

eSigned:  Bree Ulloa MD 05/01/2017 11:57 AM

 

 

cc:

 

 

 

 

PATIENT NAME:  Millicent Alegre

MR#: [id number]

## 2017-05-02 VITALS
DIASTOLIC BLOOD PRESSURE: 62 MMHG | SYSTOLIC BLOOD PRESSURE: 118 MMHG | OXYGEN SATURATION: 96 % | TEMPERATURE: 97.1 F | HEART RATE: 54 BPM | RESPIRATION RATE: 20 BRPM

## 2017-05-02 VITALS
SYSTOLIC BLOOD PRESSURE: 142 MMHG | RESPIRATION RATE: 18 BRPM | TEMPERATURE: 97.7 F | HEART RATE: 53 BPM | DIASTOLIC BLOOD PRESSURE: 69 MMHG | OXYGEN SATURATION: 100 %

## 2017-05-02 VITALS
DIASTOLIC BLOOD PRESSURE: 59 MMHG | HEART RATE: 52 BPM | TEMPERATURE: 98.2 F | RESPIRATION RATE: 18 BRPM | SYSTOLIC BLOOD PRESSURE: 137 MMHG | OXYGEN SATURATION: 99 %

## 2017-05-02 VITALS — HEART RATE: 58 BPM

## 2017-05-02 VITALS — HEART RATE: 75 BPM

## 2017-05-02 LAB
BASOPHILS # BLD AUTO: 0 TH/MM3 (ref 0–0.2)
BASOPHILS NFR BLD: 0.5 % (ref 0–2)
EOSINOPHIL # BLD: 0.3 TH/MM3 (ref 0–0.4)
EOSINOPHIL NFR BLD: 7.2 % (ref 0–4)
ERYTHROCYTE [DISTWIDTH] IN BLOOD BY AUTOMATED COUNT: 13.6 % (ref 11.6–17.2)
HCT VFR BLD CALC: 41.9 % (ref 35–46)
HEMO FLAGS: (no result)
LYMPHOCYTES # BLD AUTO: 1.7 TH/MM3 (ref 1–4.8)
LYMPHOCYTES NFR BLD AUTO: 40.4 % (ref 9–44)
MCH RBC QN AUTO: 29.3 PG (ref 27–34)
MCHC RBC AUTO-ENTMCNC: 32.6 % (ref 32–36)
MCV RBC AUTO: 89.9 FL (ref 80–100)
MONOCYTES NFR BLD: 4.5 % (ref 0–8)
NEUTROPHILS # BLD AUTO: 2 TH/MM3 (ref 1.8–7.7)
NEUTROPHILS NFR BLD AUTO: 47.4 % (ref 16–70)
PLATELET # BLD: 205 TH/MM3 (ref 150–450)
RBC # BLD AUTO: 4.66 MIL/MM3 (ref 4–5.3)
WBC # BLD AUTO: 4.3 TH/MM3 (ref 4–11)

## 2017-05-02 RX ADMIN — ASPIRIN SCH MG: 325 TABLET ORAL at 08:26

## 2017-05-02 RX ADMIN — OXYCODONE HYDROCHLORIDE AND ACETAMINOPHEN PRN TAB: 10; 325 TABLET ORAL at 08:26

## 2017-05-02 RX ADMIN — OXYCODONE HYDROCHLORIDE AND ACETAMINOPHEN PRN TAB: 10; 325 TABLET ORAL at 02:11

## 2017-05-02 RX ADMIN — DICYCLOMINE HYDROCHLORIDE SCH MG: 20 TABLET ORAL at 08:25

## 2017-05-02 RX ADMIN — ONDANSETRON PRN MG: 2 INJECTION, SOLUTION INTRAMUSCULAR; INTRAVENOUS at 06:42

## 2017-05-02 RX ADMIN — SUCRALFATE SCH GM: 1 SUSPENSION ORAL at 07:00

## 2017-05-02 RX ADMIN — SUCRALFATE SCH GM: 1 SUSPENSION ORAL at 11:18

## 2017-05-02 RX ADMIN — NEBIVOLOL HYDROCHLORIDE SCH MG: 10 TABLET ORAL at 08:25

## 2017-05-02 RX ADMIN — PANTOPRAZOLE SCH MG: 40 TABLET, DELAYED RELEASE ORAL at 08:25

## 2017-05-02 RX ADMIN — HYDROMORPHONE HYDROCHLORIDE PRN MG: 1 INJECTION, SOLUTION INTRAMUSCULAR; INTRAVENOUS; SUBCUTANEOUS at 01:10

## 2017-05-02 RX ADMIN — Medication SCH ML: at 08:26

## 2017-05-02 RX ADMIN — HYDROMORPHONE HYDROCHLORIDE PRN MG: 1 INJECTION, SOLUTION INTRAMUSCULAR; INTRAVENOUS; SUBCUTANEOUS at 06:37

## 2017-05-02 NOTE — HHI.DS
__________________________________________________





Discharge Summary


Admission Date


Apr 30, 2017 at 05:39


Discharge Date:  May 2, 2017


Admitting Diagnosis








(1) Abdominal pain


ICD Code:  R10.9


(2) Gastritis


ICD Code:  K29.70


Procedures


EGD


Brief History - From Admission


61 years old female presented to the ED with the complaint of severe abdominal 

pain mostly on the right side reach 10 out of 10.  Positive nausea no vomiting, 

no diarrhea or constipation.  Patient reported spider bite on the left side of 

her abdomen which a week ago which was sore and swelling in her legs and feet 

and in her hands.


No diarrhea or constipation dysuria urgency frequency orthopnea or PND


CBC/BMP:  


5/2/17 1010                                                                    

            5/1/17 1520





Significant Findings





Laboratory Tests








Test 4/30/17 5/1/17 5/1/17 5/2/17





 11:55 15:20 15:23 10:10


 


Lymphocytes (%) (Auto) 48.0 %   





 (9.0-44.0)   


 


Monocytes (%) (Auto) 8.7 % (0.0-8.0)  8.3 % (0.0-8.0) 


 


Eosinophils (%) (Auto) 6.2 % (0.0-4.0)  5.0 % (0.0-4.0) 7.2 % (0.0-4.0)


 


Neutrophils # (Auto) 1.7 TH/MM3   





 (1.8-7.7)   


 


Chloride Level 109 MEQ/L   





 ()   


 


Estimat Glomerular Filtration 70 ML/MIN (>89) 56 ML/MIN (>89)  





Rate    


 


Calcium Level 8.4 MG/DL   





 (8.5-10.1)   


 


Troponin I LESS THAN 0.02   





 NG/ML   





 (0.02-0.05)   


 


Albumin 3.1 GM/DL   





 (3.4-5.0)   


 


HDL Cholesterol 39.0 MG/DL   





 (40.0-60.0)   


 


Creatinine  1.01 MG/DL  





  (0.50-1.00)  








PE at Discharge


GENERAL: This is a well-nourished, well-developed patient, in no apparent 

distress.


SKIN: No rashes, warm and dry 


HEAD: Atraumatic. Normocephalic. 


EYES: Pupils equal round and reactive. Extraocular motions intact. No scleral 

icterus. 


ENT: Nose without bleeding, or drainage, Airway patent.


NECK: Trachea midline.  Supple


CARDIOVASCULAR: Regular rate and rhythm without murmurs, gallops, or rubs. 


RESPIRATORY: Fair air entry bilaterally. No wheezes, rales, or rhonchi.  


GASTROINTESTINAL: Abdomen soft, significant tenderness to light palpation, 

nondistended.  Positive bowel sounds, no guarding, 4-5 cm discoloration macula 

on the left lower quadrant(patient reported spider bite)


MUSCULOSKELETAL: Extremities without clubbing, cyanosis, or edema.  Pedal 

pulses appreciated


NEUROLOGICAL: Awake and alert.  Moves all extremity.  Normal speech.no focal 

neurological deficit


Hospital Course


61 years old female admitted with severe right abdominal pain with nausea, she 

had multiple surgeries in the past as well as history of stomach ulcer, GS and 

GI consulted pain management iv fluid, also cardiac enzyme order to rule out 

underlying ischemia,CT scan abdomen and pelvis with IV contrast (4/29/17)---> 

Scattered colonic diverticuli, stool, and otherwise unremarkable.


   S/P EGD (5/1/17)--->  1.  There was LA Class A esophagitis noted; biopsy was 

performed  2.  There was erythematous gastritis


   in the gastric antrum; biopsy was performed 3.  Normal duodenal mucosa in 

the 2nd part of the duodenum  4.  Retroflexed


   views revealed no abnormalities.  Pathology pending. PPI.  Tolerating diet.  

Feeling better, patient wanted to go home she will follow up with GI as an 

outpatient


Pt Condition on Discharge:  Fair


Discharge Disposition:  Discharge Home


Discharge Time:  <= 30 minutes


Discharge Instructions


DIET: Follow Instructions for:  Heart Healthy Diet


Activities you can perform:  Weight Bearing as Maria Esther


Follow up Referrals:  


Gastroenterology - 10 Days with Bree Ulloa MD





New Medications:  


Sucralfate Liq (Sucralfate Liq) 1 Gm/10 Ml [place]


1 GM PO ACHS gastritis #30 TAB


 


Continued Medications:  


Clonazepam (Clonazepam) 1 Mg Tab


1 MG PO TID PRN ANXIETY #90 Ref 0 TAB


Dicyclomine (Bentyl) 20 Mg Tab


20 MG PO TID Bowel Management Ref 0 TAB


Nebivolol (Bystolic) 10 Mg Tab


10 MG PO DAILY Blood Pressure Management #30 Ref 0 TAB


Pantoprazole (Protonix) 40 Mg Tab


40 MG PO DAILY Reflux #30 Ref 0 TAB











Nicanor Rowe MD May 2, 2017 14:20

## 2017-05-02 NOTE — HHI.GIFU
Subjective


Remarks


Up in chair.  No n/v.  No abdominal pain.  Tolerating diet.  States she is 

going home today. (Adrianna Hernandez)





Objective


Vitals I&O





 Vital Signs








  Date Time  Temp Pulse Resp B/P Pulse Ox O2 Delivery O2 Flow Rate FiO2


 


5/2/17 11:40  58      


 


5/2/17 08:25 97.1 54 20 118/62 96   


 


5/2/17 07:08   19     


 


5/2/17 04:00 98.2 52 18 137/59 99   


 


5/2/17 03:51   18     


 


5/2/17 02:46  75      


 


5/2/17 00:00 97.7 53 18 142/69 100   


 


5/1/17 20:28     99   21


 


5/1/17 20:00 98.0 57 18 147/69 98   


 


5/1/17 16:20 97.0 66 20 151/65 99   


 


5/1/17 13:39 97.7 65 20 130/57 100   


 


5/1/17 12:16  62 16 148/68 98   








 I/O








 5/1/17 5/1/17 5/1/17 5/2/17 5/2/17 5/2/17





 07:00 15:00 23:00 07:00 15:00 23:00


 


Intake Total 480 ml 300 ml    


 


Balance 480 ml 300 ml    


 


      


 


Intake Oral 480 ml     


 


Other  300 ml    


 


# Voids 1  1 2  


 


# Bowel Movements   0 0  








Laboratory





Laboratory Tests








Test 5/1/17 5/1/17 5/2/17





 15:20 15:23 10:10


 


Sodium Level 140   


 


Potassium Level 4.1   


 


Chloride Level 106   


 


Carbon Dioxide Level 27.6   


 


Anion Gap 6   


 


Blood Urea Nitrogen 16   


 


Creatinine 1.01   


 


Estimat Glomerular Filtration 56   





Rate   


 


Random Glucose 99   


 


Calcium Level 8.5   


 


White Blood Count  4.6  4.3 


 


Red Blood Count  4.52  4.66 


 


Hemoglobin  13.3  13.7 


 


Hematocrit  40.6  41.9 


 


Mean Corpuscular Volume  89.7  89.9 


 


Mean Corpuscular Hemoglobin  29.5  29.3 


 


Mean Corpuscular Hemoglobin  32.9  32.6 





Concent   


 


Red Cell Distribution Width  13.5  13.6 


 


Platelet Count  200  205 


 


Mean Platelet Volume  9.7  10.0 


 


Neutrophils (%) (Auto)  48.1  47.4 


 


Lymphocytes (%) (Auto)  37.5  40.4 


 


Monocytes (%) (Auto)  8.3  4.5 


 


Eosinophils (%) (Auto)  5.0  7.2 


 


Basophils (%) (Auto)  1.1  0.5 


 


Neutrophils # (Auto)  2.2  2.0 


 


Lymphocytes # (Auto)  1.7  1.7 


 


Monocytes # (Auto)  0.4  0.2 


 


Eosinophils # (Auto)  0.2  0.3 


 


Basophils # (Auto)  0.1  0.0 


 


CBC Comment  DIFF FINAL  DIFF FINAL 


 


Differential Comment     


 


Erythrocyte Sedimentation Rate  14  








Physical Exam


HEENT: Normocephalic; atraumatic; no jaundice.  Throat is clear.


NECK: Neck is supple, no JVD, no lymphadenopathy.


CHEST:  CTA


CARDIAC:  RRR


ABDOMEN:  Soft, nondistended, nontender; no hepatosplenomegaly; bowel sounds 

are present in all four quadrants.


EXTREMITIES: No clubbing, cyanosis, or edema.


SKIN:  Normal; no rash; no jaundice.


CNS:  No focal deficits; alert and oriented times three. (Adrianna Hernandez)





Assessment and Plan


Plan


ASSESSMENT:


- Abdominal pain.  CT scan abdomen and pelvis with IV contrast (4/29/17)---> 

Scattered colonic diverticuli, stool, and otherwise unremarkable.


   S/P EGD (5/1/17)--->  1.  There was LA Class A esophagitis noted; biopsy was 

performed  2.  There was erythematous gastritis


   in the gastric antrum; biopsy was performed 3.  Normal duodenal mucosa in 

the 2nd part of the duodenum  4.  Retroflexed


   views revealed no abnormalities.  Pathology pending. PPI.  Tolerating diet.  

Feeling better.  Hoping to go home today.





PLAN:


- Okay to be discharged from GI standpoint


- Await pathology


- PPI


- Avoid NSAIDs


- FU SUNI 2 weeks


- PT seen and examined by Dr. Ulloa and myself and this note is written on 

his behalf  (Adrianna Hernandez)


Physician Comments


Seen and examined with LATISHA, s/p egd. DC home today with gi fu please. Thank you

 (Bree Ulloa MD)








Adrianna Hernandez May 2, 2017 12:11


Bree Ulloa MD May 2, 2017 14:36

## 2018-05-15 ENCOUNTER — HOSPITAL ENCOUNTER (OUTPATIENT)
Dept: HOSPITAL 17 - NEDDLT | Age: 63
Setting detail: OBSERVATION
Discharge: LEFT BEFORE DISCHARGE | End: 2018-05-15
Attending: INTERNAL MEDICINE | Admitting: INTERNAL MEDICINE
Payer: OTHER GOVERNMENT

## 2018-05-15 VITALS
SYSTOLIC BLOOD PRESSURE: 155 MMHG | DIASTOLIC BLOOD PRESSURE: 85 MMHG | TEMPERATURE: 98.3 F | OXYGEN SATURATION: 96 % | RESPIRATION RATE: 18 BRPM | HEART RATE: 56 BPM

## 2018-05-15 DIAGNOSIS — F17.210: ICD-10-CM

## 2018-05-15 DIAGNOSIS — R07.9: Primary | ICD-10-CM

## 2018-05-15 DIAGNOSIS — M54.5: ICD-10-CM

## 2018-05-15 DIAGNOSIS — I10: ICD-10-CM

## 2018-05-15 PROCEDURE — 93005 ELECTROCARDIOGRAM TRACING: CPT

## 2018-05-15 PROCEDURE — 96374 THER/PROPH/DIAG INJ IV PUSH: CPT

## 2018-05-15 PROCEDURE — 85025 COMPLETE CBC W/AUTO DIFF WBC: CPT

## 2018-05-15 PROCEDURE — 85610 PROTHROMBIN TIME: CPT

## 2018-05-15 PROCEDURE — 85730 THROMBOPLASTIN TIME PARTIAL: CPT

## 2018-05-15 PROCEDURE — 80053 COMPREHEN METABOLIC PANEL: CPT

## 2018-05-15 PROCEDURE — 84484 ASSAY OF TROPONIN QUANT: CPT

## 2018-05-15 PROCEDURE — 99285 EMERGENCY DEPT VISIT HI MDM: CPT

## 2018-05-15 PROCEDURE — 71045 X-RAY EXAM CHEST 1 VIEW: CPT

## 2018-05-15 PROCEDURE — G0378 HOSPITAL OBSERVATION PER HR: HCPCS

## 2018-05-15 NOTE — HHI.HP
HPI


Primary Care Physician


No Primary Care Physician


Chief Complaint


Chest pain


History of Present Illness


This is a 62-year-old female the presents to ED in [place] via private vehicle 

to be evaluated for chest discomfort and was subsequently transported via [facility] 

to the chest pain center.  She states that this morning she developed a 

discomfort in the left side of her chest while she was lying down at home.  

Described as a pressure.  When asked how long it lasted, patient states "I do 

not know, I was not timing it.  I got the pain and then I went to the hospital.

"  The Dilaudid helped the discomfort but she states it wore off while in the 

ambulance and is now a 7 out of 10.  She then states she has a another type of 

discomfort with this.  States she also has Chan's esophagus and is having a 

burning pain in her throat for the last 2 days.  States she takes Protonix for 

this daily.  Has not taken any other medications for this discomfort.  Denies 

nausea or vomiting.  Denies abdominal pain.  Denies blood in stool.  States she 

has been following her cardiologist for many years.  They are in [place].  Had 

a chemical stress test at this hospital in January and she believes it was 

normal.  Has had cardiac catheterizations in the past, states most recent one 

was about 10 years ago that was okay.  Denies recent illness.  Denies fevers or 

chills.





Review of Systems


General: Patient denies fevers, chills, and recent travel.


HEENT: Patient denies headache, sore throat, difficulty swallowing.  


Cardiovascular: Has the chest discomfort as mentioned above.  Denies sensation 

of heart beating rapidly or irregularly.  No syncope.  Denies diaphoresis.


Respiratory: Denies shortness of breath or inspirational chest discomfort.  

Denies coughing wheezing or hemoptysis.  


GI: Patient denies nausea, vomiting, diarrhea, abdominal pain, bloody stools.


Musculoskeletal: Chronic low back pain.  Patient denies joint pain or edema.  

Denies calf pain or edema.


Neurovascular: Patient denies numbness, tingling, weakness in extremities.  

Denies headache.


Endocrine: Denies polyuria and polydipsia.


Hematologic: Denies easy bruising.


Skin: Denies rash or itching.





Past Family Social History


Allergies:  


Coded Allergies:  


     amlodipine (Verified  Allergy, Severe, Swelling, 5/15/18)


     fentanyl (Verified  Allergy, Severe, Hives, 5/15/18)


     ketorolac (Verified  Allergy, Severe, Hives, 5/15/18)


     metoprolol (Verified  Allergy, Severe, Sedation, 5/15/18)


     morphine (Verified  Allergy, Severe, Hives, 5/15/18)


 TOPICAL MORPHINE CAUSES ITCHING


     prednisolone (Verified  Allergy, Severe, Bradycardia, 5/15/18)


     tramadol (Verified  Allergy, Severe, Swelling, 5/15/18)


Uncoded Allergies:  


     steroids (Allergy, Severe, Bradycardia, 5/15/18)


Past Medical History


Hypertension, states history of Chan's esophagus, EGD from May 2017 

revealing esophagitis.  Chronic low back pain, diverticulitis, and tobacco 

abuse.  Denies diabetes, hyperlipidemia, and CAD.


Past Surgical History


Appendectomy, , cholecystectomy, hysterectomy, partial thyroidectomy.


Reported Medications





Reported Meds & Active Scripts


Active


Ventolin Hfa 18 GM Inh (Albuterol Sulfate) 90 Mcg/Act Aer 2 Puff INH Q6H PRN


Reported


Bentyl (Dicyclomine HCl) 10 Mg Cap 10 Mg PO TID PRN


Percocet (Oxycodone-Acetaminophen)  mg Tab 1 Tab PO Q6H PRN


Protonix (Pantoprazole Sodium) 40 Mg Tab 40 Mg PO DAILY


Clonazepam 1 Mg Tab 1 Mg PO TID PRN


Bystolic (Nebivolol) 10 Mg Tab 10 Mg PO DAILY


Active Ordered Medications





Current Medications








 Medications


  (Trade)  Dose


 Ordered  Sig/Kareem


 Route  Start Time


 Stop Time Status Last Admin


 


  (Tylenol)  500 mg  Q4H  PRN


 PO  5/15/18 14:15


   UNV  


 


 


  (Zofran Inj)  4 mg  Q6H  PRN


 IV PUSH  5/15/18 14:15


   UNV  


 


 


  (Mag-Al Plus


 Susp Liq)  30 ml  NOW  ONCE


 PO  5/15/18 14:15


 5/15/18 14:16 UNV  


 


 


  (KlonoPIN)  1 mg  TID  PRN


 PO  5/15/18 14:15


   UNV  


 


 


  (Bystolic)  10 mg  DAILY


 PO  18 09:00


   UNV  


 


 


  (Percocet 


 Mg)  1 tab  Q6H  PRN


 PO  5/15/18 14:15


   UNV  


 


 


  (Protonix)  40 mg  DAILY


 PO  18 09:00


   UNV  


 








Family History


Her brother has stents.


Social History


Smokes 7 8 cigarettes a day for 45 years.  Denies illicit drug use.  Denies 

alcohol abuse.





Physical Exam


Vital Signs





Vital Signs








  Date Time  Temp Pulse Resp B/P (MAP) Pulse Ox O2 Delivery O2 Flow Rate FiO2


 


5/15/18 14:04 98.3 56 18 155/85 (108) 96   








Physical Exam


GENERAL: This is a well-nourished, well-developed patient, in no apparent 

distress.  Patient speaks in clear complete sentences.  Patient is pleasant.  

The patient was examined with a female nurse chaperone at bedside.


HEENT: Head is atraumatic and normocephalic.  Neck is supple without 

lymphadenopathy and trachea is midline.  No JVD or carotid bruits.


CARDIOVASCULAR: Regular rate and rhythm without murmurs, gallops, or rubs. 


RESPIRATORY: Clear to auscultation. Breath sounds equal bilaterally. No wheezes

, rales, or rhonchi.  Chest wall is nontender.  No use of accessory muscles.


GASTROINTESTINAL: Abdomen is nontender, nondistended.  Abdomen soft.  No 

obvious pulsatile mass or bruit.  No CVA tenderness.  Strong femoral pulses 

bilaterally.  Normal bowel sounds in all quadrants.


MUSCULOSKELETAL: Patient is moving upper and lower extremities freely.  No calf 

tenderness or edema, no Homans sign.  Strong pulses in upper and lower 

extremities.


NEUROLOGICAL: Patient is alert and oriented.  Cranial nerves 2-12 are grossly 

intact.  No focal deficits and speech is clear.


SKIN: No rash and turgor is normal.


Imaging


Chest x-ray reveals nothing acute.


Course


EKGs: First 2 EKGs are sinus tachycardia without significant ST segment 

depressions or elevations.





Caprini VTE Risk Assessment


Caprini VTE Risk Assessment:  No/Low Risk (score <= 1)


Caprini Risk Assessment Model











 Point Value = 1          Point Value = 2  Point Value = 3  Point Value = 5


 


Age 41-60


Minor surgery


BMI > 25 kg/m2


Swollen legs


Varicose veins


Pregnancy or postpartum


History of unexplained or recurrent


   spontaneous 


Oral contraceptives or hormone


   replacement


Sepsis (< 1 month)


Serious lung disease, including


   pneumonia (< 1 month)


Abnormal pulmonary function


Acute myocardial infarction


Congestive heart failure (< 1 month)


History of inflammatory bowel disease


Medical patient at bed rest Age 61-74


Arthroscopic surgery


Major open surgery (> 45 min)


Laparoscopic surgery (> 45 min)


Malignancy


Confined to bed (> 72 hours)


Immobilizing plaster cast


Central venous access Age >= 75


History of VTE


Family history of VTE


Factor V Leiden


Prothrombin 32332O


Lupus anticoagulant


Anticardiolipin antibodies


Elevated serum homocysteine


Heparin-induced thrombocytopenia


Other congenital or acquired


   thrombophilia Stroke (< 1 month)


Elective arthroplasty


Hip, pelvis, or leg fracture


Acute spinal cord injury (< 1 month)








Prophylaxis Regimen











   Total Risk


Factor Score Risk Level Prophylaxis Regimen


 


0-1      Low Early ambulation


 


2 Moderate Order ONE of the following:


*Sequential Compression Device (SCD)


*Heparin 5000 units SQ BID


 


3-4 Higher Order ONE of the following medications:


*Heparin 5000 units SQ TID


*Enoxaparin/Lovenox 40 mg SQ daily (WT < 150 kg, CrCl > 30 mL/min)


*Enoxaparin/Lovenox 30 mg SQ daily (WT < 150 kg, CrCl > 10-29 mL/min)


*Enoxaparin/Lovenox 30 mg SQ BID (WT < 150 kg, CrCl > 30 mL/min)


AND/OR


*Sequential Compression Device (SCD)


 


5 or more Highest Order ONE of the following medications:


*Heparin 5000 units SQ TID (Preferred with Epidurals)


*Enoxaparin/Lovenox 40 mg SQ daily (WT < 150 kg, CrCl > 30 mL/min)


*Enoxaparin/Lovenox 30 mg SQ daily (WT < 150 kg, CrCl > 10-29 mL/min)


*Enoxaparin/Lovenox 30 mg SQ BID (WT < 150 kg, CrCl > 30 mL/min)


AND


*Sequential Compression Device (SCD)











Assessment and Plan


Assessment and Plan


* Chest pain: Patient will continue to have serial cardiac enzymes and EKGs for 

ruling out purposes.  She will be seen by Dr. Carvajal of cardiology in the 

chest pain center.  Further plan pending her evaluation as well as records we 

have requested from this hospital related to the patient's stress test about 4 

months ago.  Patient should follow-up with her cardiologist and PCP after 

discharge.


* Hypertension: Continue medication.


* History of esophagitis: Continue Protonix.  Give a dose of Mylanta.


* Chronic back pain: Continue medication.


* Tobacco abuse: Patient has been counseled on the importance of smoking 

cessation.


Patient is stable at this time.  She is agreeable to this plan.





I was informed by the patient's nurse that the patient requests her IV to be 

pulled and left AGAINST MEDICAL ADVICE.  She had left before I was able to talk 

with her again.











Gregory Duffy May 15, 2018 14:18